# Patient Record
Sex: FEMALE | Race: WHITE | NOT HISPANIC OR LATINO | Employment: UNEMPLOYED | URBAN - METROPOLITAN AREA
[De-identification: names, ages, dates, MRNs, and addresses within clinical notes are randomized per-mention and may not be internally consistent; named-entity substitution may affect disease eponyms.]

---

## 2023-12-06 ENCOUNTER — OFFICE VISIT (OUTPATIENT)
Dept: PRIMARY CARE | Facility: CLINIC | Age: 33
End: 2023-12-06
Payer: COMMERCIAL

## 2023-12-06 VITALS
TEMPERATURE: 98.4 F | DIASTOLIC BLOOD PRESSURE: 76 MMHG | BODY MASS INDEX: 25.83 KG/M2 | SYSTOLIC BLOOD PRESSURE: 132 MMHG | RESPIRATION RATE: 16 BRPM | OXYGEN SATURATION: 98 % | HEIGHT: 65 IN | WEIGHT: 155 LBS | HEART RATE: 82 BPM

## 2023-12-06 DIAGNOSIS — H69.93 DYSFUNCTION OF BOTH EUSTACHIAN TUBES: Primary | ICD-10-CM

## 2023-12-06 DIAGNOSIS — R09.82 POSTNASAL DRIP: ICD-10-CM

## 2023-12-06 PROBLEM — N86 CERVICAL ECTROPION: Status: ACTIVE | Noted: 2022-11-18

## 2023-12-06 PROBLEM — M25.831 MASS OF JOINT OF RIGHT WRIST: Status: ACTIVE | Noted: 2021-10-27

## 2023-12-06 PROBLEM — E03.9 HYPOTHYROIDISM: Status: ACTIVE | Noted: 2023-12-06

## 2023-12-06 PROBLEM — D64.9 ANEMIA: Status: ACTIVE | Noted: 2023-12-06

## 2023-12-06 PROBLEM — R87.610 ASCUS WITH POSITIVE HIGH RISK HPV CERVICAL: Status: ACTIVE | Noted: 2019-04-25

## 2023-12-06 PROBLEM — Z98.891 HISTORY OF UTERINE SCAR FROM PREVIOUS SURGERY: Status: ACTIVE | Noted: 2022-06-10

## 2023-12-06 PROBLEM — R87.810 ASCUS WITH POSITIVE HIGH RISK HPV CERVICAL: Status: ACTIVE | Noted: 2019-04-25

## 2023-12-06 PROBLEM — H91.90 SUBJECTIVE HEARING CHANGE: Status: ACTIVE | Noted: 2023-12-06

## 2023-12-06 PROBLEM — S39.012A LOW BACK STRAIN: Status: ACTIVE | Noted: 2023-12-06

## 2023-12-06 PROBLEM — H93.13 SUBJECTIVE TINNITUS OF BOTH EARS: Status: ACTIVE | Noted: 2023-12-06

## 2023-12-06 PROBLEM — F32.A ANXIETY AND DEPRESSION: Status: ACTIVE | Noted: 2020-04-15

## 2023-12-06 PROBLEM — G56.31 RADIAL TUNNEL SYNDROME, RIGHT: Status: ACTIVE | Noted: 2021-10-27

## 2023-12-06 PROBLEM — F41.9 ANXIETY AND DEPRESSION: Status: ACTIVE | Noted: 2020-04-15

## 2023-12-06 PROBLEM — E87.5 HYPERKALEMIA: Status: ACTIVE | Noted: 2023-12-06

## 2023-12-06 PROBLEM — G56.11: Status: ACTIVE | Noted: 2021-10-27

## 2023-12-06 PROCEDURE — 99213 OFFICE O/P EST LOW 20 MIN: CPT | Performed by: STUDENT IN AN ORGANIZED HEALTH CARE EDUCATION/TRAINING PROGRAM

## 2023-12-06 PROCEDURE — 1036F TOBACCO NON-USER: CPT | Performed by: STUDENT IN AN ORGANIZED HEALTH CARE EDUCATION/TRAINING PROGRAM

## 2023-12-06 RX ORDER — LEVOTHYROXINE SODIUM 125 UG/1
TABLET ORAL
COMMUNITY
Start: 2023-08-31

## 2023-12-06 ASSESSMENT — ENCOUNTER SYMPTOMS
RHINORRHEA: 1
FEVER: 0
SINUS PAIN: 0
VOMITING: 0
CHILLS: 0
DIARRHEA: 0
DIAPHORESIS: 0
SHORTNESS OF BREATH: 0
DIZZINESS: 0
NAUSEA: 0
LIGHT-HEADEDNESS: 1
SINUS PRESSURE: 0

## 2023-12-06 NOTE — PATIENT INSTRUCTIONS
I recommend starting Flonase also known as fluticasone, 1 spray in each nostril once a day for the next 1 to 2 weeks.  Let me know if you develop bloody nose, this is a rare side effect of this medication.  I recommend starting Zyrtec also known as cetirizine, 10 mg once a day and can be purchased over-the-counter.  I recommend using arm and Hammer simply saline 3 times a day as needed to help with nasal congestion, use this prior to using Flonase.  Make sure you are drinking plenty of fluids and rest.  Follow-up with me in 1 to 2 weeks, if needed.  Let me know if your symptoms do not improve and I can send in an antibiotic.  Let us know if the ringing in your ear does not resolve.  If you develop severe pain, fevers, chills, sweats, please go to the emergency room.  Follow-up with Dr. Mariee for annual physical at earliest convenience.

## 2023-12-06 NOTE — PROGRESS NOTES
"Subjective   Patient ID: Codie Bonner is a 33 y.o. female who presents for Earache.    Pt is recovering from a significant URI x2.5 weeks. She had decreased appetite, weakness as her whole family was not feeling well but finally they are all feeling much better. She is feeling much better but is still having bilateral ear pain alternating between each ears-pain started yesterday. She tried debrox, swimmer's ear drops, blowing the nose. Has also had tinnitus usually in right ear in last week. Taking motrin for ear pain/ache. Ears felt wet intermittently. Was using nasal saline spray PRN.          Review of Systems   Constitutional:  Negative for chills, diaphoresis and fever.   HENT:  Positive for ear pain, postnasal drip (in am only), rhinorrhea (in am only) and tinnitus. Negative for ear discharge, hearing loss, sinus pressure and sinus pain.    Eyes:  Negative for visual disturbance.   Respiratory:  Negative for shortness of breath.    Cardiovascular:  Negative for chest pain.   Gastrointestinal:  Negative for diarrhea, nausea and vomiting.   Neurological:  Positive for light-headedness (intermittent when she hasn't eaten). Negative for dizziness.       Objective   /76   Pulse 82   Temp 36.9 °C (98.4 °F) (Tympanic)   Resp 16   Ht 1.651 m (5' 5\")   Wt 70.3 kg (155 lb)   SpO2 98%   BMI 25.79 kg/m²     Physical Exam  Vitals reviewed.   HENT:      Head: Normocephalic.      Right Ear: Tympanic membrane, ear canal and external ear normal. There is no impacted cerumen.      Left Ear: Tympanic membrane, ear canal and external ear normal. There is no impacted cerumen.      Mouth/Throat:      Mouth: Mucous membranes are moist.      Pharynx: Oropharynx is clear. No oropharyngeal exudate or posterior oropharyngeal erythema.   Cardiovascular:      Rate and Rhythm: Normal rate and regular rhythm.   Pulmonary:      Effort: Pulmonary effort is normal. No respiratory distress.      Breath sounds: Normal breath " sounds.   Abdominal:      General: There is no distension.   Musculoskeletal:         General: No deformity.      Cervical back: Neck supple. No tenderness.   Lymphadenopathy:      Cervical: No cervical adenopathy.   Skin:     Coloration: Skin is not jaundiced.   Neurological:      Mental Status: She is alert.   Psychiatric:         Mood and Affect: Mood normal.         Behavior: Behavior normal.         Assessment/Plan   Problem List Items Addressed This Visit       Postnasal drip    Dysfunction of both eustachian tubes - Primary   Eustachian tube dysfunction  Postnasal drip  -Symptoms seem most consistent with likely both  - Advised to try Flonase 1 spray in each nostril for 1 to 2 weeks to help with postnasal drip and left eustachian tube dysfunction.  - Start taking cetirizine or Zyrtec 10 mg once a day for the next 1 to 2 weeks  - Can use nasal saline rinses arm and Hammer simply saline 3 times a day as needed to help with nasal congestion.  - Let me know if symptoms are later in the week, consider antibiotics if not much better but no obvious findings of bacterial infection today.  - Let us know if tinnitus does not resolve as well as other symptoms  - ER for fevers, chills, sweats, worsening symptoms.    Advised to schedule follow-up with Dr. Mariee after leaving for physical.

## 2024-02-12 ENCOUNTER — APPOINTMENT (OUTPATIENT)
Dept: PRIMARY CARE | Facility: CLINIC | Age: 34
End: 2024-02-12
Payer: COMMERCIAL

## 2024-02-16 ENCOUNTER — OFFICE VISIT (OUTPATIENT)
Dept: PRIMARY CARE | Facility: CLINIC | Age: 34
End: 2024-02-16
Payer: COMMERCIAL

## 2024-02-16 VITALS
OXYGEN SATURATION: 97 % | SYSTOLIC BLOOD PRESSURE: 110 MMHG | HEIGHT: 65 IN | RESPIRATION RATE: 16 BRPM | WEIGHT: 163 LBS | HEART RATE: 90 BPM | BODY MASS INDEX: 27.16 KG/M2 | DIASTOLIC BLOOD PRESSURE: 70 MMHG

## 2024-02-16 DIAGNOSIS — Z00.00 ROUTINE ADULT HEALTH MAINTENANCE: Primary | ICD-10-CM

## 2024-02-16 DIAGNOSIS — Q38.1 CONGENITAL TONGUE-TIE: ICD-10-CM

## 2024-02-16 PROCEDURE — 99395 PREV VISIT EST AGE 18-39: CPT | Performed by: NURSE PRACTITIONER

## 2024-02-16 PROCEDURE — 1036F TOBACCO NON-USER: CPT | Performed by: NURSE PRACTITIONER

## 2024-02-16 NOTE — PROGRESS NOTES
"Subjective   Patient ID: Codie Bonner is a 33 y.o. female who presents for Annual Exam.    Holds stress in neck and shoulders.  Tongue tie has been there her whole life.   She has to crack her jaw multiple times per day from tensions.  Sees chiro.  Wants to explore getting tongue tie revision to see if helps with shoulder and neck tension         Review of Systems  otherwise negative aside from what was mentioned above in HPI.    Objective   /70   Pulse 90   Resp 16   Ht 1.651 m (5' 5\")   Wt 73.9 kg (163 lb)   SpO2 97%   BMI 27.12 kg/m²     Physical Exam  Vitals reviewed.   Constitutional:       Appearance: Normal appearance.   HENT:      Head: Normocephalic.      Right Ear: Tympanic membrane, ear canal and external ear normal.      Left Ear: Tympanic membrane, ear canal and external ear normal.      Nose: Nose normal.      Mouth/Throat:      Mouth: Mucous membranes are moist.   Eyes:      Conjunctiva/sclera: Conjunctivae normal.      Pupils: Pupils are equal, round, and reactive to light.   Cardiovascular:      Rate and Rhythm: Normal rate and regular rhythm.      Pulses: Normal pulses.      Heart sounds: Normal heart sounds.   Pulmonary:      Effort: Pulmonary effort is normal.      Breath sounds: Normal breath sounds.   Abdominal:      General: Abdomen is flat. Bowel sounds are normal.      Palpations: Abdomen is soft.   Musculoskeletal:         General: Normal range of motion.      Cervical back: Normal range of motion and neck supple.   Skin:     General: Skin is warm.   Neurological:      Mental Status: She is alert.   Psychiatric:         Mood and Affect: Mood normal.         Behavior: Behavior normal.         Thought Content: Thought content normal.         Judgment: Judgment normal.         Assessment/Plan   Problem List Items Addressed This Visit    None  Visit Diagnoses         Codes    Routine adult health maintenance    -  Primary Z00.00    Relevant Orders    CBC and Auto Differential    " Comprehensive Metabolic Panel    Lipid Panel    TSH with reflex to Free T4 if abnormal    Vitamin D 25-Hydroxy,Total (for eval of Vitamin D levels)    Vitamin B12    Congenital tongue-tie     Q38.1    Relevant Orders    Referral to Oral Maxillofacial Surgery          Health Maintenance  Women:  Self breast exams monthly  Clinical breast examinations with PAP testing with GYN   Get fasting labs done    Immunizations and routine follow up:  TDAP: UTD  Influenza vaccine: UTD  Eye Examinations: annually  Dental Examinations: semi annually    Encourage diet and exercise to maintain healthy lifestyle.